# Patient Record
Sex: FEMALE | Race: WHITE | NOT HISPANIC OR LATINO | Employment: FULL TIME | URBAN - METROPOLITAN AREA
[De-identification: names, ages, dates, MRNs, and addresses within clinical notes are randomized per-mention and may not be internally consistent; named-entity substitution may affect disease eponyms.]

---

## 2017-09-25 DIAGNOSIS — R55 VASODEPRESSOR SYNCOPE: ICD-10-CM

## 2017-09-25 NOTE — TELEPHONE ENCOUNTER
----- Message from Eloisa Garrido sent at 9/22/2017  4:13 PM CDT -----  Contact: [facility]  [facility] called to get a refill on the pt's betaxolol 20 mg Tab sent to [facility] [place] - [address] [place] [address] For a 90 day refill.  The pharmacy can be reached @ [phone].  Thanks!!

## 2017-09-26 RX ORDER — BETAXOLOL 20 MG/1
TABLET, FILM COATED ORAL
Qty: 120 TABLET | Refills: 11 | Status: SHIPPED | OUTPATIENT
Start: 2017-09-26 | End: 2018-09-01 | Stop reason: SDUPTHER

## 2018-02-09 DIAGNOSIS — R00.2 PALPITATIONS: Primary | ICD-10-CM

## 2018-02-16 ENCOUNTER — HOSPITAL ENCOUNTER (OUTPATIENT)
Dept: CARDIOLOGY | Facility: CLINIC | Age: 38
Discharge: HOME | End: 2018-02-16
Payer: COMMERCIAL

## 2018-02-16 ENCOUNTER — OFFICE VISIT (OUTPATIENT)
Dept: ELECTROPHYSIOLOGY | Facility: CLINIC | Age: 38
End: 2018-02-16
Payer: COMMERCIAL

## 2018-02-16 ENCOUNTER — HOSPITAL ENCOUNTER (OUTPATIENT)
Facility: HOSPITAL | Age: 38
Discharge: HOME | End: 2018-02-16
Attending: INTERNAL MEDICINE | Admitting: INTERNAL MEDICINE
Payer: COMMERCIAL

## 2018-02-16 ENCOUNTER — TELEPHONE (OUTPATIENT)
Dept: ELECTROPHYSIOLOGY | Facility: CLINIC | Age: 38
End: 2018-02-16

## 2018-02-16 ENCOUNTER — SURGERY (OUTPATIENT)
Age: 38
End: 2018-02-16

## 2018-02-16 VITALS
DIASTOLIC BLOOD PRESSURE: 66 MMHG | HEART RATE: 64 BPM | SYSTOLIC BLOOD PRESSURE: 115 MMHG | WEIGHT: 162.94 LBS | HEIGHT: 69 IN | BODY MASS INDEX: 24.13 KG/M2

## 2018-02-16 DIAGNOSIS — R00.2 PALPITATIONS: ICD-10-CM

## 2018-02-16 DIAGNOSIS — Q79.60 EHLERS-DANLOS SYNDROME: Primary | ICD-10-CM

## 2018-02-16 DIAGNOSIS — R55 VASODEPRESSOR SYNCOPE: Primary | ICD-10-CM

## 2018-02-16 DIAGNOSIS — R55 SYNCOPE: ICD-10-CM

## 2018-02-16 DIAGNOSIS — Q79.60 EHLERS-DANLOS SYNDROME: ICD-10-CM

## 2018-02-16 DIAGNOSIS — R55 VASODEPRESSOR SYNCOPE: ICD-10-CM

## 2018-02-16 PROCEDURE — 93660 TILT TABLE EVALUATION: CPT | Mod: 26,,, | Performed by: INTERNAL MEDICINE

## 2018-02-16 PROCEDURE — 3008F BODY MASS INDEX DOCD: CPT | Mod: S$GLB,,, | Performed by: INTERNAL MEDICINE

## 2018-02-16 PROCEDURE — 99205 OFFICE O/P NEW HI 60 MIN: CPT | Mod: S$GLB,,, | Performed by: INTERNAL MEDICINE

## 2018-02-16 PROCEDURE — 93660 TILT TABLE EVALUATION: CPT

## 2018-02-16 PROCEDURE — 93000 ELECTROCARDIOGRAM COMPLETE: CPT | Mod: S$GLB,,, | Performed by: INTERNAL MEDICINE

## 2018-02-16 PROCEDURE — 99999 PR PBB SHADOW E&M-EST. PATIENT-LVL III: CPT | Mod: PBBFAC,,, | Performed by: INTERNAL MEDICINE

## 2018-02-16 RX ORDER — HYOSCYAMINE SULFATE 0.38 MG/1
0.38 TABLET, EXTENDED RELEASE ORAL EVERY 12 HOURS
Qty: 60 TABLET | Refills: 11 | Status: SHIPPED | OUTPATIENT
Start: 2018-02-16

## 2018-02-16 RX ORDER — POLYETHYLENE GLYCOL 3350 17 G/17G
POWDER, FOR SOLUTION ORAL
Refills: 5 | COMMUNITY
Start: 2018-01-29

## 2018-02-16 RX ORDER — DIPHENHYDRAMINE HYDROCHLORIDE 12.5 MG/1
12.5 BAR, CHEWABLE ORAL 4 TIMES DAILY PRN
COMMUNITY

## 2018-02-16 RX ORDER — ACETAMINOPHEN AND CODEINE PHOSPHATE 120; 12 MG/5ML; MG/5ML
1 SOLUTION ORAL DAILY
COMMUNITY

## 2018-02-16 RX ORDER — OMEPRAZOLE 40 MG/1
CAPSULE, DELAYED RELEASE ORAL
Refills: 2 | COMMUNITY
Start: 2018-01-29

## 2018-02-16 NOTE — TELEPHONE ENCOUNTER
Returned call.     ----- Message from Eloisa Garrido sent at 2/15/2018 10:47 AM CST -----  Contact: pt   Pt called returning your call.  She can be reached @ [phone].  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  Thanks!!

## 2018-02-16 NOTE — PROGRESS NOTES
"  Subjective:   Patient ID:  Ayana Brody is a 38 y.o. female     Chief complaint:Pre Syncope      HPI  Background as recorded in my last note (12/30/13):  Syncope-hx of VDS.   Hx of fibromyalgia.   Hx adhesions with constipation and impaction related to adhesions. She has had another surgery for that 3 months ago. She has also been started on Linzess  Since last visit , she had been doing OK until after the surgery -- started again with some palps and a lot of the "fuzzouts" --- she is now a mom to a 34 months boy. She breast fed him till age 2.5 years. 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     She has been doing strength training but she cannot do aerobic exercise.      She does not do too well when she forgets her betaxolol.   She would like to get pregnant again this summer.  She has been having some nocturnal " hallucinations"-- actually vivid dreams that wake her up.     Update since then:  She is having major gastric motility issues -- constipated-- takes Miralax daily (had in the past abd adhesion surgeries-- initially GB surgery followed by pancreatitis)--  these have been long lasting -- no appetite -- Prilosec has not helped  Doing OK with syncope but lately some L/H spells.   She has been told that she has EDS and I agree that that's a possibility -- she is hyper-flexible and has had 5 knee surgeries  Current Outpatient Prescriptions   Medication Sig    betaxolol 20 mg Tab TAKE 4 TABLETS BY MOUTH EVERY DAY. **THANK YOU**    diphenhydrAMINE (BENADRYL) 12.5 mg chewable tablet Take 12.5 mg by mouth 4 (four) times daily as needed for Allergies.    multivitamin capsule Take 1 capsule by mouth once daily.    norethindrone (MICRONOR) 0.35 mg tablet Take 1 tablet by mouth once daily.    omeprazole (PRILOSEC) 40 MG capsule TAKE ONE CAPSULE BY MOUTH DAILY BEFORE breakfast THANK YOU    polyethylene glycol (GLYCOLAX) 17 gram PwPk Take by mouth.   "    polyethylene glycol (GLYCOLAX) 17 gram/dose powder One capful PER DAY mixed WITH 8 ounces OF FLUID THANK YOU    prenatal #48-iron [id number] 20 mg iron-1 mg /25 mg TbSQ Take by mouth once daily.      ranitidine (ZANTAC) 150 MG tablet TAKE ONE TABLET BY MOUTH DAILY AT BEDTIME THANK YOU    hyoscyamine (LEVBID) 0.375 mg Tb12 Take 1 tablet (0.375 mg total) by mouth every 12 (twelve) hours.     No current facility-administered medications for this visit.      Review of Systems   Constitution: Negative for decreased appetite, weakness, weight gain and weight loss.   HENT: Negative for nosebleeds.    Eyes: Negative for blurred vision and visual disturbance.   Cardiovascular: Positive for leg swelling, near-syncope and palpitations. Negative for chest pain, claudication, cyanosis, dyspnea on exertion, irregular heartbeat, orthopnea, paroxysmal nocturnal dyspnea and syncope.   Respiratory: Negative for cough, shortness of breath and wheezing.    Endocrine: Negative for heat intolerance.   Skin: Negative for rash.   Musculoskeletal: Negative for muscle weakness and myalgias.   Gastrointestinal: Negative for abdominal pain, anorexia, melena, nausea and vomiting.   Genitourinary: Negative for menorrhagia.   Neurological: Positive for dizziness and light-headedness. Negative for excessive daytime sleepiness, headaches, loss of balance, seizures and vertigo.   Psychiatric/Behavioral: Negative for altered mental status and depression. The patient is not nervous/anxious.        Objective:   Physical Exam   Constitutional: She is oriented to person, place, and time. She appears well-developed and well-nourished.   HENT:   Head: Normocephalic and atraumatic.   Right Ear: External ear normal.   Left Ear: External ear normal.   Neck: Normal range of motion. Neck supple. No thyromegaly present.   Cardiovascular: Normal rate, regular rhythm, normal heart sounds and intact distal pulses.  Exam reveals no gallop, no S3, no S4, no  "friction rub, no midsystolic click and no opening snap.    No murmur heard.  Pulses:       Carotid pulses are 2+ on the right side, and 2+ on the left side.       Radial pulses are 2+ on the right side, and 2+ on the left side.        Dorsalis pedis pulses are 2+ on the right side, and 2+ on the left side.        Posterior tibial pulses are 2+ on the right side, and 2+ on the left side.   Pulmonary/Chest: Effort normal and breath sounds normal.   Abdominal: Soft. She exhibits no distension. There is no tenderness.   Musculoskeletal:        Right ankle: She exhibits no swelling.        Left ankle: She exhibits no swelling.        Right lower leg: She exhibits no swelling.        Left lower leg: She exhibits no swelling.   Neurological: She is alert and oriented to person, place, and time. She has normal strength. No cranial nerve deficit. Gait normal.   Skin: Skin is warm. No rash noted. No cyanosis. Nails show no clubbing.   Psychiatric: She has a normal mood and affect. Her speech is normal and behavior is normal. Thought content normal. Cognition and memory are normal.   Nursing note and vitals reviewed.    /66   Pulse 64   Ht 5' 9" (1.753 m)   Wt 73.9 kg (162 lb 14.7 oz)   BMI 24.06 kg/m²      Assessment:      1. Vasodepressor syncope -- beta hyperadrenergic type   2. Palpitations    3. Shakeel-Danlos syndrome    Constipation -- likely multifactorial -- adhesions, dysmotility etc    Plan:    CoQ 10 -- 400 mg a day   valsalva  Orders Placed This Encounter   Procedures    Cardiology Lab Abdominal Aorta Evaluation     Standing Status:   Future     Standing Expiration Date:   2/16/2019     No Follow-up on file.  Medications Discontinued During This Encounter   Medication Reason    NORGESTIMATE-ETHINYL ESTRADIOL (ORTHO TRI-CYCLEN LO, 28, ORAL) Patient no longer taking     New Prescriptions    HYOSCYAMINE (LEVBID) 0.375 MG TB12    Take 1 tablet (0.375 mg total) by mouth every 12 (twelve) hours.     Modified " Medications    No medications on file

## 2018-09-01 DIAGNOSIS — R55 VASODEPRESSOR SYNCOPE: ICD-10-CM

## 2018-09-02 RX ORDER — BETAXOLOL 20 MG/1
TABLET, FILM COATED ORAL
Qty: 120 TABLET | Refills: 11 | Status: SHIPPED | OUTPATIENT
Start: 2018-09-02 | End: 2019-08-19 | Stop reason: SDUPTHER

## 2019-08-19 DIAGNOSIS — R55 VASODEPRESSOR SYNCOPE: ICD-10-CM

## 2019-08-22 RX ORDER — BETAXOLOL 20 MG/1
TABLET, FILM COATED ORAL
Qty: 120 TABLET | Refills: 11 | Status: SHIPPED | OUTPATIENT
Start: 2019-08-22 | End: 2020-08-24

## 2022-10-21 DIAGNOSIS — R55 VASODEPRESSOR SYNCOPE: ICD-10-CM

## 2022-10-21 RX ORDER — BETAXOLOL 20 MG/1
80 TABLET, FILM COATED ORAL DAILY
Qty: 360 TABLET | Refills: 3 | Status: SHIPPED | OUTPATIENT
Start: 2022-10-21 | End: 2023-11-17

## 2022-10-21 NOTE — TELEPHONE ENCOUNTER
----- Message from Andressa Randle sent at 10/21/2022  8:15 AM CDT -----  Contact: Pt [phone]  Requesting an RX refill or new RX.  Is this a refill or new RX: betaxoloL 20 mg Tab  RX name and strength (copy/paste from chart): [facility] - [place] - [address] [place] [address]   Phone:  [phone]  Fax:  [phone]         Is this a 30 day or 90 day RX:   Pharmacy name and phone # (copy/paste from chart):    The doctors have asked that we provide their patients with the following 2 reminders -- prescription refills can take up to 72 hours, and a friendly reminder that in the future you can use your [facility] account to request refills: yes

## 2023-11-16 DIAGNOSIS — R55 VASODEPRESSOR SYNCOPE: ICD-10-CM

## 2023-11-17 RX ORDER — BETAXOLOL 20 MG/1
TABLET, FILM COATED ORAL
Qty: 360 TABLET | Refills: 3 | Status: SHIPPED | OUTPATIENT
Start: 2023-11-17